# Patient Record
Sex: FEMALE | Race: WHITE | URBAN - METROPOLITAN AREA
[De-identification: names, ages, dates, MRNs, and addresses within clinical notes are randomized per-mention and may not be internally consistent; named-entity substitution may affect disease eponyms.]

---

## 2017-02-15 ENCOUNTER — IMPORTED ENCOUNTER (OUTPATIENT)
Dept: URBAN - METROPOLITAN AREA CLINIC 50 | Facility: CLINIC | Age: 72
End: 2017-02-15

## 2017-03-22 ENCOUNTER — IMPORTED ENCOUNTER (OUTPATIENT)
Dept: URBAN - METROPOLITAN AREA CLINIC 50 | Facility: CLINIC | Age: 72
End: 2017-03-22

## 2017-04-17 ENCOUNTER — IMPORTED ENCOUNTER (OUTPATIENT)
Dept: URBAN - METROPOLITAN AREA CLINIC 50 | Facility: CLINIC | Age: 72
End: 2017-04-17

## 2017-06-20 NOTE — PATIENT DISCUSSION
New Prescription: doxycycline hyclate (doxycycline hyclate): tablet: 100 mg 1 tablet twice a day by mouth 06-

## 2017-06-20 NOTE — PATIENT DISCUSSION
New Prescription: TobraDex (tobramycin-dexamethasone): ointment: 0.3-0.1% a small amount at bedtime as needed into affected eye 06-

## 2017-06-20 NOTE — PATIENT DISCUSSION
CHALAZION OD:  PROCEED WITH INCISION AND DRAINAGE.  PRESCRIBED DOXY 100MG PO BID X 2 WEEKS AND TOBRADEX LINCOLN QHS X 2 WEEKS

## 2017-06-20 NOTE — PATIENT DISCUSSION
Chalazion Counseling:  I explained to the patient that a chalazion is an inflammatory reaction to trapped oil secretions in the eyelid. I also explained that while chalazions usually respond well to treatment, some people are prone to recurrences of them. The options for medical versus surgical treatment as well as the risks, benefits and alternatives for each were reviewed with the patient. The patient understands and desires to proceed with incision and drainage.

## 2017-06-20 NOTE — PATIENT DISCUSSION
OCULAR ROSACEA, OU:  PRESCRIBE WARM COMPRESSES AND EYELID SCRUBS QD-BID, ARTIFICIAL TEARS BID-QID AND THE DAILY INTAKE OF OMEGA-3 FATTY ACIDS. CONSIDER TOPICAL STEROID, ORAL TETRACYCLINE AND/OR LIPIFLOW FOR EXACERBATIONS. RETURN FOR FOLLOW-UP AS SCHEDULED.

## 2017-07-13 ENCOUNTER — IMPORTED ENCOUNTER (OUTPATIENT)
Dept: URBAN - METROPOLITAN AREA CLINIC 50 | Facility: CLINIC | Age: 72
End: 2017-07-13

## 2017-07-26 ENCOUNTER — IMPORTED ENCOUNTER (OUTPATIENT)
Dept: URBAN - METROPOLITAN AREA CLINIC 50 | Facility: CLINIC | Age: 72
End: 2017-07-26

## 2017-10-16 NOTE — PATIENT DISCUSSION
FAMILY HISTORY. REVIEWED VF 24-2. PACHY AND OCT RNFL WITH PT. GONIO AND PACHY DONE TODAY.  NORMAL VF. MAY BE ISCHEMIC COMPONENT OF OPTIC NERVE CHANGES DUE TO CHRONIC MIGRAINE

## 2018-02-19 ENCOUNTER — IMPORTED ENCOUNTER (OUTPATIENT)
Dept: URBAN - METROPOLITAN AREA CLINIC 50 | Facility: CLINIC | Age: 73
End: 2018-02-19

## 2018-12-11 NOTE — PATIENT DISCUSSION
BLEPHARITIS, OU: PRESCRIBE WARM COMPRESSES AND EYELID SCRUBS QD-BID, ARTIFICIAL TEARS BID-QID, THE DAILY INTAKE OF OMEGA-3 FATTY ACIDS. RETURN FOR FOLLOW-UP AS SCHEDULED.

## 2018-12-26 ENCOUNTER — IMPORTED ENCOUNTER (OUTPATIENT)
Dept: URBAN - METROPOLITAN AREA CLINIC 50 | Facility: CLINIC | Age: 73
End: 2018-12-26

## 2019-06-06 ENCOUNTER — IMPORTED ENCOUNTER (OUTPATIENT)
Dept: URBAN - METROPOLITAN AREA CLINIC 50 | Facility: CLINIC | Age: 74
End: 2019-06-06

## 2019-11-26 NOTE — PATIENT DISCUSSION
GLAUCOMA SUSPECT, OU : INCREASED CUP/DISC RATIO.MAY BE ISCHEMIC COMPONENT OF OPTIC NERVE CHANGES DUE TO CHRONIC MIGRAINE.  REVIEWED STABLE RNFL

## 2019-12-10 ENCOUNTER — IMPORTED ENCOUNTER (OUTPATIENT)
Dept: URBAN - METROPOLITAN AREA CLINIC 50 | Facility: CLINIC | Age: 74
End: 2019-12-10

## 2020-12-09 ENCOUNTER — IMPORTED ENCOUNTER (OUTPATIENT)
Dept: URBAN - METROPOLITAN AREA CLINIC 50 | Facility: CLINIC | Age: 75
End: 2020-12-09

## 2020-12-14 ENCOUNTER — IMPORTED ENCOUNTER (OUTPATIENT)
Dept: URBAN - METROPOLITAN AREA CLINIC 50 | Facility: CLINIC | Age: 75
End: 2020-12-14

## 2021-02-17 ENCOUNTER — IMPORTED ENCOUNTER (OUTPATIENT)
Dept: URBAN - METROPOLITAN AREA CLINIC 50 | Facility: CLINIC | Age: 76
End: 2021-02-17

## 2021-03-08 NOTE — PATIENT DISCUSSION
FAMILY HISTORY. REVIEWED OCT RNFL WITH PT.  MAY BE ISCHEMIC COMPONENT OF OPTIC NERVE CHANGES DUE TO CHRONIC MIGRAINE.

## 2021-04-08 NOTE — PATIENT DISCUSSION
Glaucoma Suspect Counseling:  I have explained to the patient at length glaucoma potentially causes loss of peripheral vision due to damage to the optic nerve. I have explained that damage to the optic nerve from glaucoma is irreversible and that the available treatments for glaucoma are designed to prevent further damage if we determine glaucoma is present. I have explained the importance of regular follow-up with dilated eye exams to monitor for possible progression. during second stage

## 2021-04-17 ASSESSMENT — VISUAL ACUITY
OS_SC: 20/30-
OS_BAT: 20/40
OS_SC: 20/30
OS_SC: 20/40
OS_SC: 20/30-1
OD_SC: 20/30
OS_SC: 20/30-
OD_CC: J1
OS_OTHER: 20/80. 20/80-.
OS_PH: 20/25-
OD_SC: 20/30-
OS_CC: J1+
OS_CC: J2
OD_CC: J1+
OS_CC: J1
OS_OTHER: 20/60. 20/60.
OD_BAT: 20/80-
OD_OTHER: 20/40. 20/70.
OD_CC: J2
OD_SC: 20/40-2
OD_BAT: 20/50-1
OD_BAT: 20/40
OD_SC: 20/30-2
OD_OTHER: 20/50-1. 20/60.
OD_OTHER: 20/80-. 20/100.
OS_OTHER: 20/40. 20/60.
OS_BAT: 20/60
OD_SC: 20/30-
OD_SC: 20/30-
OS_SC: 20/40-2
OS_BAT: 20/80
OD_PH: 20/25-
OS_SC: 20/50-
OD_SC: 20/40

## 2021-04-17 ASSESSMENT — TONOMETRY
OD_IOP_MMHG: 16
OD_IOP_MMHG: 15
OS_IOP_MMHG: 14
OS_IOP_MMHG: 16
OS_IOP_MMHG: 12
OD_IOP_MMHG: 13
OD_IOP_MMHG: 18
OD_IOP_MMHG: 14
OD_IOP_MMHG: 14
OS_IOP_MMHG: 15
OD_IOP_MMHG: 14
OS_IOP_MMHG: 18
OS_IOP_MMHG: 14
OS_IOP_MMHG: 13

## 2021-04-17 ASSESSMENT — PACHYMETRY
OD_CT_UM: 500
OS_CT_UM: 519
OD_CT_UM: 500
OS_CT_UM: 519

## 2021-10-25 NOTE — PATIENT DISCUSSION
MAY BE ISCHEMIC COMPONENT OF OPTIC NERVE CHANGES DUE TO CHRONIC MIGRAINE. UNKNOWN FH. GOOD IOP. FOLLOW.

## 2021-10-25 NOTE — PATIENT DISCUSSION
Moderate Dry Eyes: Prescribed disappearing preservative or preservative-free artificial tears 4-6x per day OU and the dailty intake of Omega-3 DHA/EPA fatty acids to help relieve symptoms. Add nightly lubricating ointment or gel.  Will consider Restasis or punctal plugs and/or Lipiflow treatment next visit if not responsive or if symptoms persist. Return for follow-up as scheduled or sooner if symptoms persist.

## 2021-12-10 ENCOUNTER — PREPPED CHART (OUTPATIENT)
Dept: URBAN - METROPOLITAN AREA CLINIC 49 | Facility: CLINIC | Age: 76
End: 2021-12-10

## 2021-12-13 ENCOUNTER — COMPREHENSIVE EXAM (OUTPATIENT)
Dept: URBAN - METROPOLITAN AREA CLINIC 49 | Facility: CLINIC | Age: 76
End: 2021-12-13

## 2021-12-13 DIAGNOSIS — H04.122: ICD-10-CM

## 2021-12-13 DIAGNOSIS — H25.13: ICD-10-CM

## 2021-12-13 DIAGNOSIS — H43.813: ICD-10-CM

## 2021-12-13 DIAGNOSIS — H35.363: ICD-10-CM

## 2021-12-13 PROCEDURE — 92134 CPTRZ OPH DX IMG PST SGM RTA: CPT

## 2021-12-13 PROCEDURE — 92014 COMPRE OPH EXAM EST PT 1/>: CPT

## 2021-12-13 ASSESSMENT — VISUAL ACUITY
OD_GLARE: 20/30
OS_GLARE: 20/40-1
OS_GLARE: 20/40-1
OD_SC: 20/40-2
OD_GLARE: 20/30
OU_CC: J1
OD_PH: 20/25-1
OS_SC: 20/30
OS_PH: 20/25

## 2021-12-13 ASSESSMENT — TONOMETRY
OD_IOP_MMHG: 14
OS_IOP_MMHG: 14

## 2022-10-25 NOTE — PATIENT DISCUSSION
MAY BE ISCHEMIC COMPONENT OF OPTIC NERVE CHANGES DUE TO CHRONIC MIGRAINE. UNKNOWN FH. GOOD IOP. FOLLOW. discussed VF/RNFL with pt, follow.

## 2022-12-19 ENCOUNTER — COMPREHENSIVE EXAM (OUTPATIENT)
Dept: URBAN - METROPOLITAN AREA CLINIC 49 | Facility: CLINIC | Age: 77
End: 2022-12-19

## 2022-12-19 PROCEDURE — 92134 CPTRZ OPH DX IMG PST SGM RTA: CPT

## 2022-12-19 PROCEDURE — 92014 COMPRE OPH EXAM EST PT 1/>: CPT

## 2022-12-19 ASSESSMENT — TONOMETRY
OD_IOP_MMHG: 14
OD_IOP_MMHG: 17
OS_IOP_MMHG: 17
OS_IOP_MMHG: 15

## 2022-12-19 ASSESSMENT — VISUAL ACUITY
OD_GLARE: 20/40
OS_GLARE: 20/40
OU_CC: J1+ @ 14 IN
OD_PH: 20/25-1
OD_GLARE: 20/40
OS_GLARE: 20/40
OS_PH: 20/25-1
OD_SC: 20/40-1
OS_SC: 20/40+2

## 2023-12-04 ENCOUNTER — COMPREHENSIVE EXAM (OUTPATIENT)
Dept: URBAN - METROPOLITAN AREA CLINIC 49 | Facility: LOCATION | Age: 78
End: 2023-12-04

## 2023-12-04 DIAGNOSIS — H35.372: ICD-10-CM

## 2023-12-04 DIAGNOSIS — H43.813: ICD-10-CM

## 2023-12-04 DIAGNOSIS — H02.834: ICD-10-CM

## 2023-12-04 DIAGNOSIS — H02.831: ICD-10-CM

## 2023-12-04 DIAGNOSIS — H04.122: ICD-10-CM

## 2023-12-04 DIAGNOSIS — H25.13: ICD-10-CM

## 2023-12-04 DIAGNOSIS — H35.363: ICD-10-CM

## 2023-12-04 PROCEDURE — 92134 CPTRZ OPH DX IMG PST SGM RTA: CPT

## 2023-12-04 PROCEDURE — 92014 COMPRE OPH EXAM EST PT 1/>: CPT

## 2023-12-04 ASSESSMENT — VISUAL ACUITY
OS_GLARE: 20/60
OD_GLARE: 20/40-1
OD_GLARE: 20/40-1
OS_PH: 20/40-1
OS_GLARE: 20/50-1
OD_PH: 20/30-2
OS_SC: 20/50-2
OD_SC: 20/40

## 2023-12-04 ASSESSMENT — TONOMETRY
OS_IOP_MMHG: 15
OS_IOP_MMHG: 17
OD_IOP_MMHG: 15
OD_IOP_MMHG: 18

## 2024-09-06 ENCOUNTER — PRE-OP/H&P (OUTPATIENT)
Dept: URBAN - METROPOLITAN AREA CLINIC 49 | Facility: LOCATION | Age: 79
End: 2024-09-06

## 2024-09-06 DIAGNOSIS — H35.372: ICD-10-CM

## 2024-09-06 DIAGNOSIS — H25.13: ICD-10-CM

## 2024-09-06 DIAGNOSIS — H35.363: ICD-10-CM

## 2024-09-06 PROCEDURE — PREOP PRE OP VISIT

## 2024-09-06 PROCEDURE — 92025IOL CORNEAL TOPOGRAPHY PREMIUM IOL

## 2024-09-06 PROCEDURE — 92136 OPHTHALMIC BIOMETRY: CPT

## 2024-09-06 PROCEDURE — 92134 CPTRZ OPH DX IMG PST SGM RTA: CPT

## 2024-09-18 ENCOUNTER — SURGERY/PROCEDURE (OUTPATIENT)
Dept: URBAN - METROPOLITAN AREA SURGERY 16 | Facility: SURGERY | Age: 79
End: 2024-09-18

## 2024-09-18 ENCOUNTER — POST-OP (OUTPATIENT)
Dept: URBAN - METROPOLITAN AREA CLINIC 48 | Facility: LOCATION | Age: 79
End: 2024-09-18

## 2024-09-18 DIAGNOSIS — Z98.41: ICD-10-CM

## 2024-09-18 DIAGNOSIS — H25.813: ICD-10-CM

## 2024-09-18 DIAGNOSIS — Z96.1: ICD-10-CM

## 2024-09-18 PROCEDURE — 66984AV REMOVE CATARACT, INSERT ADVANCED LENS

## 2024-09-18 PROCEDURE — 99199PAV ADVANCED VISION

## 2024-09-23 ENCOUNTER — POST-OP (OUTPATIENT)
Dept: URBAN - METROPOLITAN AREA CLINIC 53 | Facility: CLINIC | Age: 79
End: 2024-09-23

## 2024-09-23 DIAGNOSIS — Z98.41: ICD-10-CM

## 2024-09-23 DIAGNOSIS — Z96.1: ICD-10-CM

## 2024-09-23 DIAGNOSIS — H25.812: ICD-10-CM

## 2024-10-02 ENCOUNTER — POST-OP (OUTPATIENT)
Dept: URBAN - METROPOLITAN AREA CLINIC 48 | Facility: LOCATION | Age: 79
End: 2024-10-02

## 2024-10-02 ENCOUNTER — SURGERY/PROCEDURE (OUTPATIENT)
Dept: URBAN - METROPOLITAN AREA SURGERY 16 | Facility: SURGERY | Age: 79
End: 2024-10-02

## 2024-10-02 DIAGNOSIS — Z98.42: ICD-10-CM

## 2024-10-02 DIAGNOSIS — H25.812: ICD-10-CM

## 2024-10-02 DIAGNOSIS — Z96.1: ICD-10-CM

## 2024-10-02 PROCEDURE — 66984AV REMOVE CATARACT, INSERT ADVANCED LENS: Mod: 79,LT

## 2024-10-02 PROCEDURE — 99199PAV ADVANCED VISION

## 2024-10-18 ENCOUNTER — POST-OP (OUTPATIENT)
Dept: URBAN - METROPOLITAN AREA CLINIC 49 | Facility: LOCATION | Age: 79
End: 2024-10-18

## 2024-10-18 DIAGNOSIS — Z98.42: ICD-10-CM

## 2024-10-18 DIAGNOSIS — Z96.1: ICD-10-CM

## 2024-10-18 PROCEDURE — 99024 POSTOP FOLLOW-UP VISIT: CPT

## 2024-11-01 ENCOUNTER — POST-OP (OUTPATIENT)
Dept: URBAN - METROPOLITAN AREA CLINIC 49 | Facility: LOCATION | Age: 79
End: 2024-11-01

## 2024-11-01 DIAGNOSIS — Z98.42: ICD-10-CM

## 2024-11-01 DIAGNOSIS — Z98.41: ICD-10-CM

## 2024-11-01 DIAGNOSIS — Z96.1: ICD-10-CM
